# Patient Record
Sex: MALE | Race: OTHER | Employment: FULL TIME | URBAN - METROPOLITAN AREA
[De-identification: names, ages, dates, MRNs, and addresses within clinical notes are randomized per-mention and may not be internally consistent; named-entity substitution may affect disease eponyms.]

---

## 2024-09-14 ENCOUNTER — HOSPITAL ENCOUNTER (EMERGENCY)
Facility: HOSPITAL | Age: 34
Discharge: HOME | End: 2024-09-14
Attending: EMERGENCY MEDICINE
Payer: COMMERCIAL

## 2024-09-14 VITALS
TEMPERATURE: 97.8 F | OXYGEN SATURATION: 99 % | SYSTOLIC BLOOD PRESSURE: 119 MMHG | DIASTOLIC BLOOD PRESSURE: 81 MMHG | RESPIRATION RATE: 17 BRPM | HEART RATE: 58 BPM

## 2024-09-14 DIAGNOSIS — R31.9 HEMATURIA: Primary | ICD-10-CM

## 2024-09-14 DIAGNOSIS — N20.0 KIDNEY STONE: ICD-10-CM

## 2024-09-14 LAB
ALBUMIN SERPL BCG-MCNC: 4.9 G/DL (ref 3.5–5)
ALP SERPL-CCNC: 88 U/L (ref 34–104)
ALT SERPL W P-5'-P-CCNC: 26 U/L (ref 7–52)
ANION GAP SERPL CALCULATED.3IONS-SCNC: 6 MMOL/L (ref 4–13)
AST SERPL W P-5'-P-CCNC: 25 U/L (ref 13–39)
BACTERIA UR QL AUTO: NORMAL /HPF
BASOPHILS # BLD AUTO: 0.06 THOUSANDS/ΜL (ref 0–0.1)
BASOPHILS NFR BLD AUTO: 1 % (ref 0–1)
BILIRUB SERPL-MCNC: 0.48 MG/DL (ref 0.2–1)
BILIRUB UR QL STRIP: NEGATIVE
BUN SERPL-MCNC: 16 MG/DL (ref 5–25)
CALCIUM SERPL-MCNC: 10.1 MG/DL (ref 8.4–10.2)
CHLORIDE SERPL-SCNC: 105 MMOL/L (ref 96–108)
CLARITY UR: CLEAR
CO2 SERPL-SCNC: 30 MMOL/L (ref 21–32)
COLOR UR: COLORLESS
CREAT SERPL-MCNC: 0.82 MG/DL (ref 0.6–1.3)
EOSINOPHIL # BLD AUTO: 0.32 THOUSAND/ΜL (ref 0–0.61)
EOSINOPHIL NFR BLD AUTO: 4 % (ref 0–6)
ERYTHROCYTE [DISTWIDTH] IN BLOOD BY AUTOMATED COUNT: 12.5 % (ref 11.6–15.1)
GFR SERPL CREATININE-BSD FRML MDRD: 117 ML/MIN/1.73SQ M
GLUCOSE SERPL-MCNC: 89 MG/DL (ref 65–140)
GLUCOSE UR STRIP-MCNC: NEGATIVE MG/DL
HCT VFR BLD AUTO: 44.2 % (ref 36.5–49.3)
HGB BLD-MCNC: 15.7 G/DL (ref 12–17)
HGB UR QL STRIP.AUTO: ABNORMAL
IMM GRANULOCYTES # BLD AUTO: 0.02 THOUSAND/UL (ref 0–0.2)
IMM GRANULOCYTES NFR BLD AUTO: 0 % (ref 0–2)
KETONES UR STRIP-MCNC: NEGATIVE MG/DL
LEUKOCYTE ESTERASE UR QL STRIP: NEGATIVE
LIPASE SERPL-CCNC: 38 U/L (ref 11–82)
LYMPHOCYTES # BLD AUTO: 1.6 THOUSANDS/ΜL (ref 0.6–4.47)
LYMPHOCYTES NFR BLD AUTO: 18 % (ref 14–44)
MCH RBC QN AUTO: 31.1 PG (ref 26.8–34.3)
MCHC RBC AUTO-ENTMCNC: 35.5 G/DL (ref 31.4–37.4)
MCV RBC AUTO: 88 FL (ref 82–98)
MONOCYTES # BLD AUTO: 0.56 THOUSAND/ΜL (ref 0.17–1.22)
MONOCYTES NFR BLD AUTO: 6 % (ref 4–12)
NEUTROPHILS # BLD AUTO: 6.46 THOUSANDS/ΜL (ref 1.85–7.62)
NEUTS SEG NFR BLD AUTO: 71 % (ref 43–75)
NITRITE UR QL STRIP: NEGATIVE
NON-SQ EPI CELLS URNS QL MICRO: NORMAL /HPF
NRBC BLD AUTO-RTO: 0 /100 WBCS
PH UR STRIP.AUTO: 5.5 [PH]
PLATELET # BLD AUTO: 259 THOUSANDS/UL (ref 149–390)
PMV BLD AUTO: 9.8 FL (ref 8.9–12.7)
POTASSIUM SERPL-SCNC: 4.1 MMOL/L (ref 3.5–5.3)
PROT SERPL-MCNC: 8.1 G/DL (ref 6.4–8.4)
PROT UR STRIP-MCNC: NEGATIVE MG/DL
RBC # BLD AUTO: 5.05 MILLION/UL (ref 3.88–5.62)
RBC #/AREA URNS AUTO: NORMAL /HPF
SODIUM SERPL-SCNC: 141 MMOL/L (ref 135–147)
SP GR UR STRIP.AUTO: 1 (ref 1–1.03)
UROBILINOGEN UR STRIP-ACNC: <2 MG/DL
WBC # BLD AUTO: 9.02 THOUSAND/UL (ref 4.31–10.16)
WBC #/AREA URNS AUTO: NORMAL /HPF

## 2024-09-14 PROCEDURE — 96374 THER/PROPH/DIAG INJ IV PUSH: CPT

## 2024-09-14 PROCEDURE — 80053 COMPREHEN METABOLIC PANEL: CPT | Performed by: STUDENT IN AN ORGANIZED HEALTH CARE EDUCATION/TRAINING PROGRAM

## 2024-09-14 PROCEDURE — 85025 COMPLETE CBC W/AUTO DIFF WBC: CPT | Performed by: STUDENT IN AN ORGANIZED HEALTH CARE EDUCATION/TRAINING PROGRAM

## 2024-09-14 PROCEDURE — 99284 EMERGENCY DEPT VISIT MOD MDM: CPT | Performed by: EMERGENCY MEDICINE

## 2024-09-14 PROCEDURE — 83690 ASSAY OF LIPASE: CPT | Performed by: STUDENT IN AN ORGANIZED HEALTH CARE EDUCATION/TRAINING PROGRAM

## 2024-09-14 PROCEDURE — 81001 URINALYSIS AUTO W/SCOPE: CPT | Performed by: STUDENT IN AN ORGANIZED HEALTH CARE EDUCATION/TRAINING PROGRAM

## 2024-09-14 PROCEDURE — 99283 EMERGENCY DEPT VISIT LOW MDM: CPT

## 2024-09-14 PROCEDURE — 36415 COLL VENOUS BLD VENIPUNCTURE: CPT | Performed by: STUDENT IN AN ORGANIZED HEALTH CARE EDUCATION/TRAINING PROGRAM

## 2024-09-14 RX ORDER — ACETAMINOPHEN 325 MG/1
975 TABLET ORAL ONCE
Status: COMPLETED | OUTPATIENT
Start: 2024-09-14 | End: 2024-09-14

## 2024-09-14 RX ORDER — KETOROLAC TROMETHAMINE 30 MG/ML
15 INJECTION, SOLUTION INTRAMUSCULAR; INTRAVENOUS ONCE
Status: COMPLETED | OUTPATIENT
Start: 2024-09-14 | End: 2024-09-14

## 2024-09-14 RX ADMIN — KETOROLAC TROMETHAMINE 15 MG: 30 INJECTION, SOLUTION INTRAMUSCULAR; INTRAVENOUS at 18:38

## 2024-09-14 RX ADMIN — ACETAMINOPHEN 975 MG: 325 TABLET ORAL at 18:32

## 2024-09-14 NOTE — ED ATTENDING ATTESTATION
9/14/2024  IDanny MD, saw and evaluated the patient. I have discussed the patient with the resident/non-physician practitioner and agree with the resident's/non-physician practitioner's findings, Plan of Care, and MDM as documented in the resident's/non-physician practitioner's note, except where noted. All available labs and Radiology studies were reviewed.  I was present for key portions of any procedure(s) performed by the resident/non-physician practitioner and I was immediately available to provide assistance.       At this point I agree with the current assessment done in the Emergency Department.  I have conducted an independent evaluation of this patient a history and physical is as follows:    ED Course  ED Course as of 09/14/24 1809   Sat Sep 14, 2024   1756 Per resident h&p 32 YO M h/o ureteral colic presents with dysuria and L CVA tenderness reminiscent of prior ureteral colic O: M in discomfort I/P UA; pain control   Emergency Department Note- Kuldip Sampson 33 y.o. male MRN: [id number]    Unit/Bed#: [facility] Encounter: [id number]    Kuldip Sampson is a 33 y.o. male who presents with   Chief Complaint   Patient presents with    Flank Pain     Left sided flank pain          History of Present Illness   HPI:  Kuldip Sampson is a 33 y.o. male who presents for evaluation of:  Left-sided flank pain that started this morning and has become progressively more severe this afternoon.  The pain is reminiscent of prior episodes of ureteral colic.  Patient took some acetaminophen at home without resolution of his discomfort.  He notes some associated dysuria as well but denies any gross hematuria.  Denies associated fevers and chills.  Denies associated nausea and vomiting.  Pain is moderate to severe in intensity.  Open does not provoke the discomfort; rest does not palliate the discomfort.    Review of Systems   Constitutional:  Negative for fatigue and fever.   HENT:  Negative for congestion and sore  throat.    Respiratory:  Negative for cough and shortness of breath.    Cardiovascular:  Negative for chest pain and palpitations.   Gastrointestinal:  Negative for abdominal pain and nausea.   Genitourinary:  Positive for flank pain. Negative for frequency.   Neurological:  Negative for light-headedness and headaches.   Psychiatric/Behavioral:  Negative for dysphoric mood and hallucinations.    All other systems reviewed and are negative.      Historical Information   History reviewed. No pertinent past medical history.  History reviewed. No pertinent surgical history.  Social History   Social History     Substance and Sexual Activity   Alcohol Use None     Social History     Substance and Sexual Activity   Drug Use Not on file     Social History     Tobacco Use   Smoking Status Never   Smokeless Tobacco Never     Family History: History reviewed. No pertinent family history.    Meds/Allergies   PTA meds:   None     No Known Allergies    Objective   First Vitals:   Blood Pressure: 147/97 (09/14/24 1712)  Pulse: 71 (09/14/24 1712)  Temperature: 97.8 °F (36.6 °C) (09/14/24 1712)  Temp Source: Tympanic (09/14/24 1712)  Respirations: 18 (09/14/24 1712)  SpO2: 99 % (09/14/24 1712)    Current Vitals:   Blood Pressure: 147/97 (09/14/24 1712)  Pulse: 71 (09/14/24 1712)  Temperature: 97.8 °F (36.6 °C) (09/14/24 1712)  Temp Source: Tympanic (09/14/24 1712)  Respirations: 18 (09/14/24 1712)  SpO2: 99 % (09/14/24 1712)    No intake or output data in the 24 hours ending 09/14/24 1810    Invasive Devices       None                   Physical Exam  Vitals and nursing note reviewed.   Constitutional:       General: He is in acute distress (x secondary to pain).      Appearance: Normal appearance. He is well-developed.   HENT:      Head: Normocephalic and atraumatic.      Right Ear: External ear normal.      Left Ear: External ear normal.      Nose: Nose normal.      Mouth/Throat:      Pharynx: No oropharyngeal exudate.   Eyes:      " Conjunctiva/sclera: Conjunctivae normal.      Pupils: Pupils are equal, round, and reactive to light.   Cardiovascular:      Rate and Rhythm: Normal rate and regular rhythm.   Pulmonary:      Effort: Pulmonary effort is normal. No respiratory distress.   Abdominal:      General: Abdomen is flat. There is no distension.      Palpations: Abdomen is soft.   Musculoskeletal:         General: No deformity. Normal range of motion.      Cervical back: Normal range of motion and neck supple.   Skin:     General: Skin is warm and dry.      Capillary Refill: Capillary refill takes less than 2 seconds.   Neurological:      General: No focal deficit present.      Mental Status: He is alert and oriented to person, place, and time. Mental status is at baseline.      Coordination: Coordination normal.   Psychiatric:         Mood and Affect: Mood normal.         Behavior: Behavior normal.         Thought Content: Thought content normal.         Judgment: Judgment normal.           Medical Decision Makin.  Acute left flank pain reminiscent of prior episodes of ureteral colic: Pain control, urinalysis to rule out UTI.    No results found for this or any previous visit (from the past 36 hour(s)).  No orders to display         Portions of the record may have been created with voice recognition software. Occasional wrong word or \"sound a like\" substitutions may have occurred due to the inherent limitations of voice recognition software.  Read the chart carefully and recognize, using context, where substitutions have occurred.          Critical Care Time  Procedures      "

## 2024-09-15 NOTE — ED PROVIDER NOTES
1. Hematuria    2. Kidney stone      ED Disposition       ED Disposition   Discharge    Condition   Stable    Date/Time   Sat Sep 14, 2024  7:54 PM    Comment   Kuldip Sampson discharge to home/self care.                   Assessment & Plan       Medical Decision Making  33-year-old male with known history of kidney stones, first discovered age 27, presents to the emergency department for left flank pain which she tells me feels just like his past episodes of nephrolithiasis.  Patient has dysuria.  CVA tenderness noted on examination with no abdominal tenderness.  Urine sample appears clear however upon running urinalysis, there are large amounts of blood.  No signs of infection.  Remainder of workup to include lipase CBC and CMP are largely unremarkable.  Given patient's symptoms and examination findings and history, this most likely represents a renal stone.  Given lack of signs of infection on UA and normal vital signs, very unlikely this is infected stone does not require antibiotics.  He is having some dysuria which also suggest a stone may have passed causing some mild trauma to the urethra which is likely the cause of his dysuria.  Patient given Toradol, Tylenol, and oral fluids here in the emergency department.  Upon reevaluation patient states pain is largely resolved and he is feeling well.  No indication for CT scan.  Patient remained hemodynamically stable while under my care and is appropriate for discharge home with clear return precautions.    Amount and/or Complexity of Data Reviewed  Labs: ordered.    Risk  OTC drugs.  Prescription drug management.                       Medications   ketorolac (TORADOL) injection 15 mg (15 mg Intravenous Given 9/14/24 1838)   acetaminophen (TYLENOL) tablet 975 mg (975 mg Oral Given 9/14/24 1832)       History of Present Illness       33-year-old male with past history of nephrolithiasis presents to the emergency department evaluation of 1 day history of left flank  pain.  Patient denies fevers chills nausea or vomiting.  He states the pain is only his left flank and most recently he developed some dysuria in the form of burning with urination.  Pain began this morning suddenly.  Denies visible hematuria, foul-smelling urine.  No new sexual contacts.  Only has pain with urination.  Pain in the flank is relatively constant and responded briefly to Tylenol earlier this morning.  Tells me this feels just like the kidney stone he was diagnosed with 6 years ago which passed spontaneously.  Patient has no other significant past medical history.            Review of Systems   Constitutional:  Negative for activity change, chills, fatigue and fever.   Respiratory:  Negative for chest tightness.    Gastrointestinal:  Negative for abdominal pain, nausea and vomiting.   Genitourinary:  Positive for dysuria and flank pain. Negative for decreased urine volume, frequency, hematuria, penile pain, penile swelling, scrotal swelling, testicular pain and urgency.   Musculoskeletal:  Negative for back pain and myalgias.   Neurological:  Negative for dizziness and headaches.           Objective     ED Triage Vitals [09/14/24 1712]   Temperature Pulse Blood Pressure Respirations SpO2 Patient Position - Orthostatic VS   97.8 °F (36.6 °C) 71 147/97 18 99 % Sitting      Temp Source Heart Rate Source BP Location FiO2 (%) Pain Score    Tympanic Monitor Left arm -- 8        Physical Exam  Vitals reviewed.   Constitutional:       General: He is in acute distress.   HENT:      Head: Normocephalic and atraumatic.   Cardiovascular:      Rate and Rhythm: Normal rate and regular rhythm.      Pulses: Normal pulses.      Heart sounds: Normal heart sounds. No murmur heard.  Pulmonary:      Effort: Pulmonary effort is normal. No respiratory distress.      Breath sounds: Normal breath sounds.   Abdominal:      General: Abdomen is flat.      Palpations: Abdomen is soft.      Tenderness: There is no abdominal  tenderness. There is left CVA tenderness. There is no right CVA tenderness or guarding.   Skin:     General: Skin is warm and dry.      Coloration: Skin is not jaundiced.      Findings: No lesion or rash.   Neurological:      Mental Status: He is alert and oriented to person, place, and time.         Labs Reviewed   UA W REFLEX TO MICROSCOPIC WITH REFLEX TO CULTURE - Abnormal       Result Value    Color, UA Colorless      Clarity, UA Clear      Specific Gravity, UA 1.004      pH, UA 5.5      Leukocytes, UA Negative      Nitrite, UA Negative      Protein, UA Negative      Glucose, UA Negative      Ketones, UA Negative      Urobilinogen, UA <2.0      Bilirubin, UA Negative      Occult Blood, UA Large (*)    LIPASE - Normal    Lipase 38     URINE MICROSCOPIC - Normal    RBC, UA 1-2      WBC, UA 1-2      Epithelial Cells None Seen      Bacteria, UA None Seen     CBC AND DIFFERENTIAL    WBC 9.02      RBC 5.05      Hemoglobin 15.7      Hematocrit 44.2      MCV 88      MCH 31.1      MCHC 35.5      RDW 12.5      MPV 9.8      Platelets 259      nRBC 0      Segmented % 71      Immature Grans % 0      Lymphocytes % 18      Monocytes % 6      Eosinophils Relative 4      Basophils Relative 1      Absolute Neutrophils 6.46      Absolute Immature Grans 0.02      Absolute Lymphocytes 1.60      Absolute Monocytes 0.56      Eosinophils Absolute 0.32      Basophils Absolute 0.06     COMPREHENSIVE METABOLIC PANEL    Sodium 141      Potassium 4.1      Chloride 105      CO2 30      ANION GAP 6      BUN 16      Creatinine 0.82      Glucose 89      Calcium 10.1      AST 25      ALT 26      Alkaline Phosphatase 88      Total Protein 8.1      Albumin 4.9      Total Bilirubin 0.48      eGFR 117      Narrative:     [employer] guidelines for Chronic Kidney Disease (CKD):     Stage 1 with normal or high GFR (GFR > 90 mL/min/1.73 square meters)    Stage 2 Mild CKD (GFR = 60-89 mL/min/1.73 square meters)    Stage 3A  Moderate CKD (GFR = 45-59 mL/min/1.73 square meters)    Stage 3B Moderate CKD (GFR = 30-44 mL/min/1.73 square meters)    Stage 4 Severe CKD (GFR = 15-29 mL/min/1.73 square meters)    Stage 5 End Stage CKD (GFR <15 mL/min/1.73 square meters)  Note: GFR calculation is accurate only with a steady state creatinine     No orders to display       Procedures         Anam Zhou MD  09/14/24 4764     no